# Patient Record
Sex: MALE | URBAN - METROPOLITAN AREA
[De-identification: names, ages, dates, MRNs, and addresses within clinical notes are randomized per-mention and may not be internally consistent; named-entity substitution may affect disease eponyms.]

---

## 2019-10-28 ENCOUNTER — TRANSFERRED RECORDS (OUTPATIENT)
Dept: HEALTH INFORMATION MANAGEMENT | Facility: CLINIC | Age: 39
End: 2019-10-28

## 2019-11-15 ENCOUNTER — TRANSFERRED RECORDS (OUTPATIENT)
Dept: HEALTH INFORMATION MANAGEMENT | Facility: CLINIC | Age: 39
End: 2019-11-15

## 2019-11-20 ENCOUNTER — OFFICE VISIT (OUTPATIENT)
Dept: DERMATOLOGY | Facility: CLINIC | Age: 39
End: 2019-11-20
Payer: COMMERCIAL

## 2019-11-20 DIAGNOSIS — R21 RASH: Primary | ICD-10-CM

## 2019-11-20 RX ORDER — BETAMETHASONE DIPROPIONATE 0.5 MG/G
LOTION TOPICAL 2 TIMES DAILY
Refills: 4 | COMMUNITY
Start: 2019-10-23

## 2019-11-20 RX ORDER — AMLODIPINE BESYLATE 5 MG/1
5 TABLET ORAL DAILY
COMMUNITY
Start: 2019-10-18 | End: 2020-10-17

## 2019-11-20 RX ORDER — CYCLOBENZAPRINE HCL 10 MG
TABLET ORAL PRN
Refills: 0 | COMMUNITY
Start: 2019-03-27

## 2019-11-20 RX ORDER — PREDNISONE 10 MG/1
TABLET ORAL
Qty: 50 TABLET | Refills: 0 | Status: SHIPPED | OUTPATIENT
Start: 2019-11-20

## 2019-11-20 RX ORDER — TRIAMCINOLONE ACETONIDE 1 MG/G
CREAM TOPICAL
COMMUNITY
Start: 2019-04-24

## 2019-11-20 ASSESSMENT — PAIN SCALES - GENERAL
PAINLEVEL: MODERATE PAIN (4)
PAINLEVEL: NO PAIN (0)

## 2019-11-20 NOTE — NURSING NOTE
Dermatology Rooming Note    Darryl Nathan's goals for this visit include:   Chief Complaint   Patient presents with     Derm Problem     Eczema and chronic urticaria - using triamcinolone with slight improvement.     Ana Hendrix, CMA

## 2019-11-20 NOTE — NURSING NOTE
Lidocaine-epinephrine 1-1:614450 % injection   1.5mL once for one use, starting 11/20/2019 ending 11/20/2019,  2mL disp, R-0, injection  Injected by Ana Hendrix CMA

## 2019-11-20 NOTE — PATIENT INSTRUCTIONS
1. Increase cetirizine dose to 20 mg (likely 2 pills) twice a day.  2. Prednisone has been sent to the pharmacy.  3. We will be following up in 2 weeks about your biopsy results and to remove sutures.  4. Continue to use clobetasol ointment and Cerave lotions as you would like.   5. Consider speaking to your PCP about switching amlodipine.    Wound Care After a Biopsy    What is a skin biopsy?  A skin biopsy allows the doctor to examine a very small piece of tissue under the microscope to determine the diagnosis and the best treatment for the skin condition. A local anesthetic (numbing medicine)  is injected with a very small needle into the skin area to be tested. A small piece of skin is taken from the area. Sometimes a suture (stitch) is used.     What are the risks of a skin biopsy?  I will experience scar, bleeding, swelling, pain, crusting and redness. I may experience incomplete removal or recurrence. Risks of this procedure are excessive bleeding, bruising, infection, nerve damage, numbness, thick (hypertrophic or keloidal) scar and non-diagnostic biopsy.    How should I care for my wound for the first 24 hours?    Keep the wound dry and covered for 24 hours    If it bleeds, hold direct pressure on the area for 15 minutes. If bleeding does not stop then go to the emergency room    Avoid strenuous exercise the first 1-2 days or as your doctor instructs you    How should I care for the wound after 24 hours?    After 24 hours, remove the bandage    You may bathe or shower as normal    If you had a scalp biopsy, you can shampoo as usual and can use shower water to clean the biopsy site daily    Clean the wound twice a day with gentle soap and water    Do not scrub, be gentle    Apply white petroleum/Vaseline after cleaning the wound with a cotton swab or a clean finger, and keep the site covered with a Bandaid /bandage. Bandages are not necessary with a scalp biopsy    If you are unable to cover the site with a  Bandaid /bandage, re-apply ointment 2-3 times a day to keep the site moist. Moisture will help with healing    Avoid strenuous activity for first 1-2 days    Avoid lakes, rivers, pools, and oceans until the stitches are removed or the site is healed    How do I clean my wound?    Wash hands thoroughly with soap or use hand  before all wound care    Clean the wound with gentle soap and water    Apply white petroleum/Vaseline  to wound after it is clean    Replace the Bandaid /bandage to keep the wound covered for the first few days or as instructed by your doctor    If you had a scalp biopsy, warm shower water to the area on a daily basis should suffice    What should I use to clean my wound?     Cotton-tipped applicators (Qtips )    White petroleum jelly (Vaseline ). Use a clean new container and use Q-tips to apply.    Bandaids   as needed    Gentle soap     How should I care for my wound long term?    Do not get your wound dirty    Keep up with wound care for one week or until the area is healed.    A small scab will form and fall off by itself when the area is completely healed. The area will be red and will become pink in color as it heals. Sun protection is very important for how your scar will turn out. Sunscreen with an SPF 30 or greater is recommended once the area is healed.    If you have stitches, stitches need to be removed in 14 days. You may return to our clinic for this or you may have it done locally at your doctor s office.    You should have some soreness but it should be mild and slowly go away over several days. Talk to your doctor about using tylenol for pain,    When should I call my doctor?  If you have increased:     Pain or swelling    Pus or drainage (clear or slightly yellow drainage is ok)    Temperature over 100F    Spreading redness or warmth around wound    When will I hear about my results?  The biopsy results can take 2-3 weeks to come back. The clinic will call you with the  results, send you a [employer] message, or have you schedule a follow-up clinic or phone time to discuss the results. Contact our clinics if you do not hear from us in 3 weeks.     Who should I call with questions?    [facility]: [phone]     [facility]: [phone]    For urgent needs outside of business hours call the [facility] at [phone] and ask for the dermatology resident on call

## 2019-11-20 NOTE — PROGRESS NOTES
[facility] Dermatology Note      Dermatology Problem List:  1. Recurrent pruritic skin eruption, urticarial appearance. Ddx drug v urticarial BP v other. Onset ~4/2019.  - s/p punch biopsy 11/20/19  - Prednisone course started 11/20/19  - Cetirizine 20 mg BID  - Topicals: TMC 0.1% cream, betamethasone ointment  - Prior rx: prednisone courses with temporary improvement, TMC 0.1% cream  - Prior outside bx 10/29/19: superficial and mid inflammation with eos and neuts - ddx chronic urticaria versus urticarial drug rash  2. History of latent TB, treated with INH.    Encounter Date: Nov 20, 2019    CC:  Chief Complaint   Patient presents with     Derm Problem     Eczema and chronic urticaria - using triamcinolone with slight improvement.       History of Present Illness:  Mr. Darryl Nathan is a 39 year old male who presents as a referral from [employer] for a pruritic skin eruption, given previous diagnosis of eczema, flaring on and off since April 2019. The rash first appeared a week or two after a positive Strep test which was treated with azithromycin. He was then given a course of prednisone for his rash, which cleared it for about 1 month. It then returned and was again given a 3 week course of prednisone. He also applies triamcinolone 0.1% cream BID, Cerave anti-itch lotion multiple times daily, and takes 10 mg cetirizine daily. 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 He uses a mild Dove bar soap.     He has a prior biopsy in October which was read as superficial and mid inflammation with eosinophils and neutrophils, ddx chronic urticaria versus urticarial drug rash. He states the lesions are present for more than 24 hours in the same locations.    Other medications include amlodipine, which was started in September 2017 for high blood pressure and flexeril which was started in March 2019 for back pain.  He only takes flexeril about monthly. He also has a history of asthma. He denies having seasonal allergies.     Also found upon chart review that in September he also started a PPI for GI bleed but he did not tell us he was taking this today. He may have completed his 8 week course of PPI.    Past Medical History:   There is no problem list on file for this patient.    Past Medical History:   Diagnosis Date     GI bleed      Hypertension      Latent tuberculosis     treated with INH     No past surgical history on file.    Social History:  Patient reports that he quit smoking about 3 years ago. He has never used smokeless tobacco.   He works as a nurse at [employer]     Family History:  Family History   Problem Relation Age of Onset     Melanoma No family hx of      Skin Cancer No family hx of    Father with lung cancer.  No family history of eczema.    Medications:  Current Outpatient Medications   Medication Sig Dispense Refill     amLODIPine (NORVASC) 5 MG tablet Take 5 mg by mouth daily       betamethasone dipropionate (DIPROSONE) 0.05 % external lotion 2 times daily  4     cyclobenzaprine (FLEXERIL) 10 MG tablet as needed  0     predniSONE (DELTASONE) 10 MG tablet 40 mg x 5 days, then 30 mg x 5 days, 20 mg x 5 days, 10 mg x 5 days 50 tablet 0     triamcinolone (KENALOG) 0.1 % external cream        Allergies   Allergen Reactions     Penicillins Unknown         Review of Systems:  -Constitutional: Otherwise feeling well today, in usual state of health.  -Skin: As above in HPI. No additional skin concerns.    Physical exam:  GEN: This is a well developed, well-nourished male in no acute distress, in a pleasant mood.    SKIN: Total skin examination, which includes the head/face, both arms, chest, back, abdomen, both legs, digits and/or nails, was examined.  -Johnson skin type: III  - There are many edematous, pink, raised macules, papules, and plaques confluent on the lateral and anterior legs, arms, anterior  feet, upper chest, and flanks.  -No other lesions of concern on areas examined.     Impression/Plan:  1. Recurrent pruritic skin eruption with urticarial appearance. Ddx drug versus urticarial BP versus other. Present since April 2019 with intermittent flares, treated with prednisone courses with temporary improvement. Prior bx with superficial and mid inflammation with eos and neuts, ddx urticaria versus urticarial drug. Medications include amlodipine daily, flexeril monthly. Also is supposed to be on PPI but this was initiated in Sept per chart review. Will repeat bx today for clarification with DIF. Do not think that repeat course of prednisone is best management option but patient strongly requesting given difficulties in his work and home life. Advised will give one course of prednisone pending bx results but will not give again in future. I am most concerned about drug eruption and advised he talk to PCP about changing amlodipine. We should also ask if he is still taking PPI at next visit.    Punch biopsy x2 for H&E and DIF (performed by faculty): After discussion of benefits and risks including but not limited to bleeding, infection, scar, incomplete removal, recurrence, and non-diagnostic biopsy, written consent and photographs were obtained. Time-out was performed. The area was cleaned with isopropyl alcohol. 1.5 mL of 1% lidocaine with 1:100,000 epinephrine was injected to obtain adequate anesthesia of the lesion on the L forearm x2. Two 4 mm punch biopsies were performed.  4-0 prolene sutures were utilized to approximate the epidermal edges.  White petroleum jelly/VaselineTM and a bandage was applied to the wound.  Explicit verbal and written wound care instructions were provided.  The patient left the Dermatology Clinic in good condition.      Increase cetirizine dose to 20 mg twice daily.    Start prednisone 20 day course. We will not repeat treatment with systemic steroids in the future.    Continue  betamethasone ointment BID prn and Cerave at least daily.    Discuss discontinuation of amlodipine with PCP.     Will discuss if patient is using PPI when we call with results.    Discussed possible future treatment with light therapy.    Photos taken today.    2 week follow up to discuss biopsy results and remove sutures.    [facility]  [address]  [place] [address] on close of this encounter.    Follow-up in 2 weeks, earlier for new or changing lesions.     Staff Involved:  I, Yenny Osman, MS3, saw and examined the patient in the presence of Dr. Tejal Shaw MD.    Staff Physician:  I was present with the medical student who participated in the service and in the documentation of the note. I have verified the history and personally performed the physical exam and medical decision making. I agree with the assessment and plan of care as documented in the note.     The procedure(s) was(were) performed by myself.  Punch biopsy procedure note: After discussion of benefits and risks including but not limited to bleeding, infection, scar, incomplete removal, recurrence, and non-diagnostic biopsy, written consent and photographs were obtained. The area was cleaned with isopropyl alcohol. 1.5 mL of 1% lidocaine with epinephrine was injected to obtain adequate anesthesia of the lesion on the left forearm x2. Two 4 mm punch biopsy was performed.  4-0 Nylon sutures were utilized to approximate the epidermal edges.  White petroleum jelly/VaselineTM and a bandage was applied to the wound.  Explicit verbal and written wound care instructions were provided.  The patient left the Dermatology Clinic in good condition.    Tejal Shaw MD    Department of Dermatology  [facility]: Phone: [phone], Fax: [phone]  11/24/2019

## 2019-11-20 NOTE — LETTER
11/20/2019       RE: Darryl Nathan  [address]  [place] [address]     Dear Colleague,    Thank you for referring your patient, Darryl Nathan, to the [facility] DERMATOLOGY at [facility]. Please see a copy of my visit note below.    [facility] Dermatology Note      Dermatology Problem List:  1. Recurrent pruritic skin eruption, urticarial appearance. Ddx drug v urticarial BP v other. Onset ~4/2019.  - s/p punch biopsy 11/20/19  - Prednisone course started 11/20/19  - Cetirizine 20 mg BID  - Topicals: TMC 0.1% cream, betamethasone ointment  - Prior rx: prednisone courses with temporary improvement, TMC 0.1% cream  - Prior outside bx 10/29/19: superficial and mid inflammation with eos and neuts - ddx chronic urticaria versus urticarial drug rash  2. History of latent TB, treated with INH.    Encounter Date: Nov 20, 2019    CC:  Chief Complaint   Patient presents with     Derm Problem     Eczema and chronic urticaria - using triamcinolone with slight improvement.       History of Present Illness:  Mr. Darryl Nathan is a 39 year old male who presents as a referral from [facility] for a pruritic skin eruption, given previous diagnosis of eczema, flaring on and off since April 2019. The rash first appeared a week or two after a positive Strep test which was treated with azithromycin. He was then given a course of prednisone for his rash, which cleared it for about 1 month. It then returned and was again given a 3 week course of prednisone. He also applies triamcinolone 0.1% cream BID, Cerave anti-itch lotion multiple times daily, and takes 10 mg cetirizine daily. 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 He uses a mild Dove bar soap.     He has a prior biopsy in October which was read as superficial and mid inflammation with eosinophils and neutrophils, ddx  chronic urticaria versus urticarial drug rash. He states the lesions are present for more than 24 hours in the same locations.    Other medications include amlodipine, which was started in September 2017 for high blood pressure and flexeril which was started in March 2019 for back pain. He only takes flexeril about monthly. He also has a history of asthma. He denies having seasonal allergies.     Also found upon chart review that in September he also started a PPI for GI bleed but he did not tell us he was taking this today. He may have completed his 8 week course of PPI.    Past Medical History:   There is no problem list on file for this patient.    Past Medical History:   Diagnosis Date     GI bleed      Hypertension      Latent tuberculosis     treated with INH     No past surgical history on file.    Social History:  Patient reports that he quit smoking about 3 years ago. He has never used smokeless tobacco.   He works as a nurse at [employer].     Family History:  Family History   Problem Relation Age of Onset     Melanoma No family hx of      Skin Cancer No family hx of    Father with lung cancer.  No family history of eczema.    Medications:  Current Outpatient Medications   Medication Sig Dispense Refill     amLODIPine (NORVASC) 5 MG tablet Take 5 mg by mouth daily       betamethasone dipropionate (DIPROSONE) 0.05 % external lotion 2 times daily  4     cyclobenzaprine (FLEXERIL) 10 MG tablet as needed  0     predniSONE (DELTASONE) 10 MG tablet 40 mg x 5 days, then 30 mg x 5 days, 20 mg x 5 days, 10 mg x 5 days 50 tablet 0     triamcinolone (KENALOG) 0.1 % external cream        Allergies   Allergen Reactions     Penicillins Unknown         Review of Systems:  -Constitutional: Otherwise feeling well today, in usual state of health.  -Skin: As above in HPI. No additional skin concerns.    Physical exam:  GEN: This is a well developed, well-nourished male in no acute distress, in a pleasant mood.    SKIN: Total  skin examination, which includes the head/face, both arms, chest, back, abdomen, both legs, digits and/or nails, was examined.  -Johnson skin type: III  - There are many edematous, pink, raised macules, papules, and plaques confluent on the lateral and anterior legs, arms, anterior feet, upper chest, and flanks.  -No other lesions of concern on areas examined.     Impression/Plan:  1. Recurrent pruritic skin eruption with urticarial appearance. Ddx drug versus urticarial BP versus other. Present since April 2019 with intermittent flares, treated with prednisone courses with temporary improvement. Prior bx with superficial and mid inflammation with eos and neuts, ddx urticaria versus urticarial drug. Medications include amlodipine daily, flexeril monthly. Also is supposed to be on PPI but this was initiated in Sept per chart review. Will repeat bx today for clarification with DIF. Do not think that repeat course of prednisone is best management option but patient strongly requesting given difficulties in his work and home life. Advised will give one course of prednisone pending bx results but will not give again in future. I am most concerned about drug eruption and advised he talk to PCP about changing amlodipine. We should also ask if he is still taking PPI at next visit.    Punch biopsy x2 for H&E and DIF (performed by faculty): After discussion of benefits and risks including but not limited to bleeding, infection, scar, incomplete removal, recurrence, and non-diagnostic biopsy, written consent and photographs were obtained. Time-out was performed. The area was cleaned with isopropyl alcohol. 1.5 mL of 1% lidocaine with 1:100,000 epinephrine was injected to obtain adequate anesthesia of the lesion on the L forearm x2. Two 4 mm punch biopsies were performed.  4-0 prolene sutures were utilized to approximate the epidermal edges.  White petroleum jelly/VaselineTM and a bandage was applied to the wound.  Explicit  verbal and written wound care instructions were provided.  The patient left the Dermatology Clinic in good condition.      Increase cetirizine dose to 20 mg twice daily.    Start prednisone 20 day course. We will not repeat treatment with systemic steroids in the future.    Continue betamethasone ointment BID prn and Cerave at least daily.    Discuss discontinuation of amlodipine with PCP.     Will discuss if patient is using PPI when we call with results.    Discussed possible future treatment with light therapy.    Photos taken today.    2 week follow up to discuss biopsy results and remove sutures.    CC [facility] PA-C  [facility] DERMATOLOGY  [facility]  [place] [address] on close of this encounter.    Follow-up in 2 weeks, earlier for new or changing lesions.     Staff Involved:  I, Yenny Osman, MS3, saw and examined the patient in the presence of Dr. Tejal Shaw MD.    Staff Physician:  I was present with the medical student who participated in the service and in the documentation of the note. I have verified the history and personally performed the physical exam and medical decision making. I agree with the assessment and plan of care as documented in the note.     The procedure(s) was(were) performed by myself.  Punch biopsy procedure note: After discussion of benefits and risks including but not limited to bleeding, infection, scar, incomplete removal, recurrence, and non-diagnostic biopsy, written consent and photographs were obtained. The area was cleaned with isopropyl alcohol. 1.5 mL of 1% lidocaine with epinephrine was injected to obtain adequate anesthesia of the lesion on the left forearm x2. Two 4 mm punch biopsy was performed.  4-0 Nylon sutures were utilized to approximate the epidermal edges.  White petroleum jelly/VaselineTM and a bandage was applied to the wound.  Explicit verbal and written wound care instructions were provided.  The patient left the Dermatology Clinic  in good condition.    Tejal Shaw MD    Department of Dermatology  [facility]: Phone: [phone], Fax: [phone]  11/24/2019

## 2019-11-22 LAB — COPATH REPORT: NORMAL

## 2019-11-26 ENCOUNTER — TELEPHONE (OUTPATIENT)
Dept: DERMATOLOGY | Facility: CLINIC | Age: 39
End: 2019-11-26

## 2019-11-26 NOTE — TELEPHONE ENCOUNTER
Spoke with Darryl - he did not stop the amlodipine, he is not on PPI, he did not start prednisone, cetirizine has helped with itching.     Dr. Shaw is unable to see [employer] patients. We canceled his follow up scheduled for 12/4. I offered to schedule him with another provider, he declined. I offered to schedule a follow up with Dr. Shaw in a month, he declined. Darryl notes he will call back to schedule.

## 2019-11-26 NOTE — TELEPHONE ENCOUNTER
----- Message from Tejal Shaw MD sent at 11/25/2019  2:26 PM CST -----  Please let patient know we did not get a slam-dunk diagnosis on his biopsy. Reassuringly there was no signs of an autoimmune disease.  This could still be a drug reaction - has he stopped the amlodipine? Is he still taking the PPI that he was started on in September for stomach irritation/bleeding?  I hope the prednisone has helped calm things down and at his next visit, we can talk about additional options such as light therapy for a better long-term solution.

## 2022-01-17 ENCOUNTER — LAB REQUISITION (OUTPATIENT)
Dept: LAB | Facility: CLINIC | Age: 42
End: 2022-01-17

## 2022-01-17 ENCOUNTER — HOSPITAL ENCOUNTER (OUTPATIENT)
Dept: GENERAL RADIOLOGY | Facility: CLINIC | Age: 42
End: 2022-01-17
Attending: INTERNAL MEDICINE
Payer: COMMERCIAL

## 2022-01-17 DIAGNOSIS — Z86.11 PERSONAL HISTORY OF TUBERCULOSIS: ICD-10-CM

## 2022-01-17 PROCEDURE — 99207 XR CHEST 1 VIEW, EMPLOYEE HEALTH: CPT | Performed by: RADIOLOGY

## 2022-01-17 PROCEDURE — 87340 HEPATITIS B SURFACE AG IA: CPT | Performed by: INTERNAL MEDICINE

## 2022-01-17 PROCEDURE — 86706 HEP B SURFACE ANTIBODY: CPT | Performed by: INTERNAL MEDICINE

## 2022-01-17 PROCEDURE — 999N000028 XR CHEST 1 VIEW, EMPLOYEE HEALTH

## 2022-01-18 LAB
HBV SURFACE AB SERPL IA-ACNC: 34.67 M[IU]/ML
HBV SURFACE AG SERPL QL IA: NONREACTIVE

## 2023-07-18 DIAGNOSIS — R07.9 CHEST PAIN: Primary | ICD-10-CM

## 2023-07-19 ENCOUNTER — LAB (OUTPATIENT)
Dept: LAB | Facility: CLINIC | Age: 43
End: 2023-07-19
Payer: COMMERCIAL

## 2023-07-19 DIAGNOSIS — R07.9 CHEST PAIN: ICD-10-CM

## 2023-07-19 LAB
ANION GAP SERPL CALCULATED.3IONS-SCNC: 12 MMOL/L (ref 7–15)
BUN SERPL-MCNC: 16.7 MG/DL (ref 6–20)
CALCIUM SERPL-MCNC: 9.4 MG/DL (ref 8.6–10)
CHLORIDE SERPL-SCNC: 103 MMOL/L (ref 98–107)
CHOLEST SERPL-MCNC: 133 MG/DL
CREAT SERPL-MCNC: 1.01 MG/DL (ref 0.67–1.17)
DEPRECATED HCO3 PLAS-SCNC: 26 MMOL/L (ref 22–29)
GFR SERPL CREATININE-BSD FRML MDRD: >90 ML/MIN/1.73M2
GLUCOSE SERPL-MCNC: 109 MG/DL (ref 70–99)
HDLC SERPL-MCNC: 35 MG/DL
LDLC SERPL CALC-MCNC: 74 MG/DL
MAGNESIUM SERPL-MCNC: 2 MG/DL (ref 1.7–2.3)
NONHDLC SERPL-MCNC: 98 MG/DL
NT-PROBNP SERPL-MCNC: <36 PG/ML (ref 0–450)
POTASSIUM SERPL-SCNC: 4.2 MMOL/L (ref 3.4–5.3)
SODIUM SERPL-SCNC: 141 MMOL/L (ref 136–145)
TRIGL SERPL-MCNC: 118 MG/DL

## 2023-07-19 PROCEDURE — 80048 BASIC METABOLIC PNL TOTAL CA: CPT | Performed by: PATHOLOGY

## 2023-07-19 PROCEDURE — 80061 LIPID PANEL: CPT | Performed by: PATHOLOGY

## 2023-07-19 PROCEDURE — 83880 ASSAY OF NATRIURETIC PEPTIDE: CPT | Performed by: PATHOLOGY

## 2023-07-19 PROCEDURE — 83735 ASSAY OF MAGNESIUM: CPT | Performed by: PATHOLOGY

## 2023-07-19 PROCEDURE — 36415 COLL VENOUS BLD VENIPUNCTURE: CPT | Performed by: PATHOLOGY

## 2023-07-21 ENCOUNTER — OFFICE VISIT (OUTPATIENT)
Dept: CARDIOLOGY | Facility: CLINIC | Age: 43
End: 2023-07-21
Payer: COMMERCIAL

## 2023-07-21 VITALS
HEART RATE: 82 BPM | SYSTOLIC BLOOD PRESSURE: 148 MMHG | WEIGHT: 250.4 LBS | DIASTOLIC BLOOD PRESSURE: 79 MMHG | OXYGEN SATURATION: 97 %

## 2023-07-21 DIAGNOSIS — E78.2 MIXED HYPERLIPIDEMIA: ICD-10-CM

## 2023-07-21 DIAGNOSIS — I10 BENIGN ESSENTIAL HYPERTENSION: ICD-10-CM

## 2023-07-21 DIAGNOSIS — I25.5 ISCHEMIC CARDIOMYOPATHY: ICD-10-CM

## 2023-07-21 DIAGNOSIS — I21.21 ST ELEVATION MYOCARDIAL INFARCTION INVOLVING LEFT CIRCUMFLEX CORONARY ARTERY (H): Primary | ICD-10-CM

## 2023-07-21 PROCEDURE — 99205 OFFICE O/P NEW HI 60 MIN: CPT | Performed by: INTERNAL MEDICINE

## 2023-07-21 PROCEDURE — 93000 ELECTROCARDIOGRAM COMPLETE: CPT | Performed by: INTERNAL MEDICINE

## 2023-07-21 RX ORDER — METFORMIN HCL 500 MG
1000 TABLET, EXTENDED RELEASE 24 HR ORAL
COMMUNITY
Start: 2022-02-17

## 2023-07-21 RX ORDER — METOPROLOL SUCCINATE 100 MG/1
TABLET, EXTENDED RELEASE ORAL
COMMUNITY
Start: 2023-07-07

## 2023-07-21 RX ORDER — ASPIRIN 81 MG/1
TABLET, COATED ORAL
COMMUNITY
Start: 2023-07-07

## 2023-07-21 RX ORDER — LOSARTAN POTASSIUM 100 MG/1
TABLET ORAL
COMMUNITY
Start: 2023-07-07

## 2023-07-21 RX ORDER — ATORVASTATIN CALCIUM 80 MG/1
1 TABLET, FILM COATED ORAL DAILY
COMMUNITY
Start: 2021-10-04

## 2023-07-21 NOTE — PROGRESS NOTES
"[facility]  Advanced Heart Failure Clinic    Patient Name: Darryl Nathan   : 1980   Date of Visit: 2023    Primary Care Physician: ANTHONY King    Referring Physician: ANTHONY King   Reason for Visit: Establishing cardiac care    Dear Providers,     I had the pleasure of seeing Darryl Nathan today in the [facility] Advanced Heart Failure Clinic. As you know Darryl Nathan is a pleasant 43 year old year old male, who presents today for further cardiac evaluation.    Darryl Nathan is a 43-year-old male with history of coronary artery disease, inferior STEMI in 2020 (at 40 years of age).  He had a sensation of \"drowning\" with some heaviness at chest, with back and arm pain.   At that time he underwent PCI with drug-eluting stent to the left circumflex as well as RCA.  His other history includes hypertension, hyperlipidemia, gastric ulcer with GI bleed, diabetes, latent tuberculosis treated with INH.    Since his PCI he has continued to follow-up with cardiologist Dr. Casper in [facility] and completed cardiac rehab. He had a negative stress test in Dec 2020. From what I can see his last visit there was in 2021.  At that time he was doing well and his Lipitor dose was increased while he was asked to stop his clopidogrel since it had been 1 year since his PCI.      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 He is originally from [place] [facility].     Strong family h/o premature CAD.    Today he presents to establish cardiac care. He experiences \"twitches\" or \"pinch pain\" in left upper chest, mostly with activity. He has 3 kids (ages 8 yrs, 6 years and 1 year). He has not noticed symptoms similar to his MI. He has h/o asthma, but has not had to use an inhaler. No shortness of breath, PND/orthopnea, palpitations, lightheadedness, syncope, leg swelling.  He snores in his sleep, but feels he is sleeping well and not concerned about " sleep apnea. Compliant with medications and notes no problems taking them.     Home BP - not checking recently, in the past has been SBPs 120-130s, no hypertension or hypotension since increasing losartan dose.     ROS:  A complete 10-point ROS was negative except as above.    PAST MEDICAL HISTORY:  Past Medical History:   Diagnosis Date    GI bleed     Hypertension     Latent tuberculosis     treated with INH       PAST SURGICAL HISTORY:  No past surgical history on file.    FAMILY HISTORY:  Family History   Problem Relation Age of Onset    Melanoma No family hx of     Skin Cancer No family hx of    Father  of MI at 65 years of age  Brother  of MI at age 44 years    SOCIAL HISTORY:  Social History     Socioeconomic History    Marital status:    Tobacco Use    Smoking status: Former     Types: Cigarettes     Quit date: 2016     Years since quittin.6    Smokeless tobacco: Never       MEDICATIONS:  Not taking amlodipine  Current Outpatient Medications   Medication Sig    ASPIRIN LOW DOSE 81 MG EC tablet     atorvastatin (LIPITOR) 80 MG tablet Take 1 tablet by mouth daily    losartan (COZAAR) 100 MG tablet     metFORMIN (GLUCOPHAGE XR) 500 MG 24 hr tablet Take 1,000 mg by mouth    metoprolol succinate ER (TOPROL XL) 100 MG 24 hr tablet     amLODIPine (NORVASC) 5 MG tablet Take 5 mg by mouth daily    betamethasone dipropionate (DIPROSONE) 0.05 % external lotion 2 times daily (Patient not taking: Reported on 2023)    cyclobenzaprine (FLEXERIL) 10 MG tablet as needed (Patient not taking: Reported on 2023)    predniSONE (DELTASONE) 10 MG tablet 40 mg x 5 days, then 30 mg x 5 days, 20 mg x 5 days, 10 mg x 5 days (Patient not taking: Reported on 2023)    triamcinolone (KENALOG) 0.1 % external cream  (Patient not taking: Reported on 2023)     No current facility-administered medications for this visit.        ALLERGIES:     Allergies   Allergen Reactions    Aspirin Unknown      History of gastric ulcer with hemorrhage    Penicillins Unknown       PHYSICAL EXAM:  BP (!) 148/79 (BP Location: Right arm, Patient Position: Chair, Cuff Size: Adult Large)   Pulse 82   Wt 113.6 kg (250 lb 6.4 oz)   SpO2 97%   Gen: alert, interactive, NAD  HEENT: atraumatic, EOMI, MMM  Neck: supple, no JVD  CV: RRR, no m/r/g  Chest: CTAB, no wheezes or crackles  Abd: soft, NT, ND, +BS  Ext: no LE edema, 2+ peripheral pulses  Skin: warm and dry, no rashes on exposed surfaces  Neuro: alert and oriented, no focal deficits    LABS:    CMP  Last Comprehensive Metabolic Panel:  Sodium   Date Value Ref Range Status   07/19/2023 141 136 - 145 mmol/L Final     Potassium   Date Value Ref Range Status   07/19/2023 4.2 3.4 - 5.3 mmol/L Final     Chloride   Date Value Ref Range Status   07/19/2023 103 98 - 107 mmol/L Final     Carbon Dioxide (CO2)   Date Value Ref Range Status   07/19/2023 26 22 - 29 mmol/L Final     Anion Gap   Date Value Ref Range Status   07/19/2023 12 7 - 15 mmol/L Final     Glucose   Date Value Ref Range Status   07/19/2023 109 (H) 70 - 99 mg/dL Final     Urea Nitrogen   Date Value Ref Range Status   07/19/2023 16.7 6.0 - 20.0 mg/dL Final     Creatinine   Date Value Ref Range Status   07/19/2023 1.01 0.67 - 1.17 mg/dL Final     GFR Estimate   Date Value Ref Range Status   07/19/2023 >90 >60 mL/min/1.73m2 Final     Calcium   Date Value Ref Range Status   07/19/2023 9.4 8.6 - 10.0 mg/dL Final       NT-proBNP <36    TROP  No results found for: TROPI, TROPONIN, TROPR, TROPN    CBC  CBC RESULTS: No results for input(s): WBC, RBC, HGB, HCT, MCV, MCH, MCHC, RDW, PLT in the last 28831 hours.    LIPIDS  Recent Labs   Lab Test 07/19/23  1135   CHOL 133   HDL 35*   LDL 74   TRIG 118       TSH  No results found for: TSH    HBA1C  No results found for: A1C      CARDIAC DATA:    EKG:  Today, 7/21/2023: Heart rate 72, normal sinus rhythm, normal ECG with small Q waves in lead III    Echo:  [facility], August 2020    Final  Impressions:    1. Normal LV size, mildly increased wall thickness, normal global systolic function with an estimated EF of 60 - 65%.    2. Basal inferior segment is abnormal.    3. The mitral valve is normal, mild mitral regurgitation.    4. Echo contrast was administrered to enhance visualization of all left ventricular segments.       Stress Test:  [facility], December 2020 12/23 Nuc stress  1. No definitive evidence of relative myocardial ischemia following exercise myocardial perfusion imaging.   2. There is a mild intensity, predominantly matched reduction in tracer uptake involving the mid-basal inferolateral wall, that is not associated with regional dysfunction, and corrects almost completely with prone imaging. These findings could represent soft tissue attenuation artifact, although prior partial thickness infarction in the inferolateral   distribution cannot be excluded with a high degree of certainty.   3. Left ventricular size and function are normal with a calculated post stress left ventricular ejection fraction of 67%. No regional wall motion abnormalities.   4. No EKG changes suggestive of ischemia with stress.   5. Exercise capacity fair. 7 METS achieved.       Cardiac MRI: None available      Cardiac Catheterization:  [facility], August 2020    * Acute inferior ST elevation MI, DIAGNOSTIC - CORONARY   * The left main artery has minimal disease.   * The LAD has mild disease.  20% stenosis in proximal LAD  * The mid to distal circumflex artery is severely diseased with RENATO grade 1-2 flow (culprit).  95% stenosis in mid circumflex  * The RCA is chronically occluded with bridging and left to right collaterals, possible small channel through occlusion.  100% stenosis in proximal RCA, 40% stenosis in proximal RCA, 70% stenosis in distal RCA  HEMODYNAMICS   * The LVEDP is mildly elevated     Successful IVUS guided  2mm x 12mm Balloon, 4mm x 22mm Drug Eluting Stent, 4mm x 12mm Balloon, and  4.5mm x 12mm  Balloon to Mid to Distal Circumflex, post stenosis 0%   Successful IVUS guided 1.25mm x 6mm Balloon, 1.2mm x 12mm Balloon, 2mm x 20mm Balloon, 2.5mm x 38mm Drug Eluting Stent, 2.5mm x 38mm Balloon, 3mm x 38mm Drug Eluting Stent, and 3mm x 38mm Balloon to Proximal RCA, post stenosis 0%   Successful 2.25mm x 15mm Drug Eluting Stent,  2.25mm x 15mm Balloon, 2.5mm x 12mm Balloon, and 3mm x 20mm Balloon to Distal RCA, post stenosis 0%         ASSESSMENT/PLAN:  In summary, Darryl Nathan is here today with the following -      1.  History of inferior STEMI in August 2020, s/p PCI to circumflex and proximal and distal RCA  2.  Ischemic cardiomyopathy with preserved ejection fraction and basal inferior hypokinesis (echo August 2020)  3.  Hypertension  4.  Hyperlipidemia  5.  Diabetes mellitus.   6.  History of gastric ulcer with GI bleed      - Monitor BPs at home as his blood pressures are elevated here  - Diet changes to see impact on cholesterol as LDL 74 with his CAD. Repeat lipid panel in 3 months with diet changes, if LDL still higher than 65-70, then add zetia  - Exercise nuclear stress test  - Follow-up in 6 months      I spent 60 minutes in care of the patient today including obtaining recent medical history, personally reviewing recent cardiac testing and/or lab results, today's examination, discussion of testing results and care recommendations with patient.       Thank you for the opportunity to participate in this patient's care. Please feel free to reach out with any questions or concerns.      Maicol Fregoso MD, [facility]  Associate Professor of Medicine  Advanced Heart Failure, LVAD & Cardiac Transplant  Director, Cardio-Obstetrics  Cardio-Oncology  [facility]

## 2023-07-21 NOTE — NURSING NOTE
Cardiac Testing: Patient given instructions regarding exercise nuclear stress test. Discussed purpose, preparation, procedure and when to expect results reported back to the patient. Patient demonstrated understanding of this information and agreed to call with further questions or concerns.    Labs: Patient was given results of the laboratory testing obtained today.  Patient demonstrated understanding of this information and agreed to call with further questions or concerns. Lipids in 3 months.     Med Reconcile: Reviewed and verified all current medications with the patient. The updated medication list was printed and given to the patient.    Return Appointment: Patient given instructions regarding scheduling next clinic visit. Patient demonstrated understanding of this information and agreed to call with further questions or concerns. Follow up in 6 months with Cardiology.    Patient stated he understood all health information given and agreed to call with further questions or concerns.    Migdalia Mcduffie, RN, BSN  Cardiology RN Care Coordinator   [place]   Phone: [phone]  Fax: [phone] ([place]) [phone] ([place])

## 2023-07-21 NOTE — NURSING NOTE
Darryl Nathan's goals for this visit include:   Chief Complaint   Patient presents with     New Patient     Self referred  Chest pains occasionally for the past 2-3 weeks on and off  pinching pain        He requests these members of his care team be copied on today's visit information: yes     PCP: No Ref-Primary, Physician    Referring Provider:  No referring provider defined for this encounter.    BP (!) 148/79 (BP Location: Right arm, Patient Position: Chair, Cuff Size: Adult Large)   Pulse 82   Wt 113.6 kg (250 lb 6.4 oz)   SpO2 97%     Do you need any medication refills at today's visit? No     TONO Ford   Cardiology Team  [facility]

## 2023-07-21 NOTE — LETTER
"    2023        RE: Darryl Nathan  [address] [place] [address]        [facility]  Advanced Heart Failure Clinic    Patient Name: Darryl Nathan   : 1980   Date of Visit: 2023    Primary Care Physician: ANTHONY King    Referring Physician: ANTHONY King   Reason for Visit: Establishing cardiac care    Dear Providers,     I had the pleasure of seeing Darryl Nathan today in the [facility] Advanced Heart Failure Clinic. As you know Darryl Nathan is a pleasant 43 year old year old male, who presents today for further cardiac evaluation.    Darryl Nathan is a 43-year-old male with history of coronary artery disease, inferior STEMI in 2020 (at 40 years of age).  He had a sensation of \"drowning\" with some heaviness at chest, with back and arm pain.   At that time he underwent PCI with drug-eluting stent to the left circumflex as well as RCA.  His other history includes hypertension, hyperlipidemia, gastric ulcer with GI bleed, diabetes, latent tuberculosis treated with INH.    Since his PCI he has continued to follow-up with cardiologist Dr. Casper in [facility] and completed cardiac rehab. He had a negative stress test in Dec 2020. From what I can see his last visit there was in 2021.  At that time he was doing well and his Lipitor dose was increased while he was asked to stop his clopidogrel since it had been 1 year since his PCI.      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     Strong family h/o premature CAD.    Today he presents to establish cardiac care. He experiences \"twitches\" or \"pinch pain\" in left upper chest, mostly with activity. He has 3 kids (ages 8 yrs, 6 years and 1 year). He has not noticed symptoms similar to his MI. He has h/o asthma, but has not had to use an inhaler. No shortness of breath, PND/orthopnea, palpitations, lightheadedness, syncope, leg " swelling.  He snores in his sleep, but feels he is sleeping well and not concerned about sleep apnea. Compliant with medications and notes no problems taking them.     Home BP - not checking recently, in the past has been SBPs 120-130s, no hypertension or hypotension since increasing losartan dose.     ROS:  A complete 10-point ROS was negative except as above.    PAST MEDICAL HISTORY:  Past Medical History:   Diagnosis Date     GI bleed      Hypertension      Latent tuberculosis     treated with INH       PAST SURGICAL HISTORY:  No past surgical history on file.    FAMILY HISTORY:  Family History   Problem Relation Age of Onset     Melanoma No family hx of      Skin Cancer No family hx of    Father  of MI at 65 years of age  Brother  of MI at age 44 years    SOCIAL HISTORY:  Social History     Socioeconomic History     Marital status:    Tobacco Use     Smoking status: Former     Types: Cigarettes     Quit date: 2016     Years since quittin.6     Smokeless tobacco: Never       MEDICATIONS:  Not taking amlodipine  Current Outpatient Medications   Medication Sig     ASPIRIN LOW DOSE 81 MG EC tablet      atorvastatin (LIPITOR) 80 MG tablet Take 1 tablet by mouth daily     losartan (COZAAR) 100 MG tablet      metFORMIN (GLUCOPHAGE XR) 500 MG 24 hr tablet Take 1,000 mg by mouth     metoprolol succinate ER (TOPROL XL) 100 MG 24 hr tablet      amLODIPine (NORVASC) 5 MG tablet Take 5 mg by mouth daily     betamethasone dipropionate (DIPROSONE) 0.05 % external lotion 2 times daily (Patient not taking: Reported on 2023)     cyclobenzaprine (FLEXERIL) 10 MG tablet as needed (Patient not taking: Reported on 2023)     predniSONE (DELTASONE) 10 MG tablet 40 mg x 5 days, then 30 mg x 5 days, 20 mg x 5 days, 10 mg x 5 days (Patient not taking: Reported on 2023)     triamcinolone (KENALOG) 0.1 % external cream  (Patient not taking: Reported on 2023)     No current facility-administered  medications for this visit.        ALLERGIES:     Allergies   Allergen Reactions     Aspirin Unknown     History of gastric ulcer with hemorrhage     Penicillins Unknown       PHYSICAL EXAM:  BP (!) 148/79 (BP Location: Right arm, Patient Position: Chair, Cuff Size: Adult Large)   Pulse 82   Wt 113.6 kg (250 lb 6.4 oz)   SpO2 97%   Gen: alert, interactive, NAD  HEENT: atraumatic, EOMI, MMM  Neck: supple, no JVD  CV: RRR, no m/r/g  Chest: CTAB, no wheezes or crackles  Abd: soft, NT, ND, +BS  Ext: no LE edema, 2+ peripheral pulses  Skin: warm and dry, no rashes on exposed surfaces  Neuro: alert and oriented, no focal deficits    LABS:    CMP  Last Comprehensive Metabolic Panel:  Sodium   Date Value Ref Range Status   07/19/2023 141 136 - 145 mmol/L Final     Potassium   Date Value Ref Range Status   07/19/2023 4.2 3.4 - 5.3 mmol/L Final     Chloride   Date Value Ref Range Status   07/19/2023 103 98 - 107 mmol/L Final     Carbon Dioxide (CO2)   Date Value Ref Range Status   07/19/2023 26 22 - 29 mmol/L Final     Anion Gap   Date Value Ref Range Status   07/19/2023 12 7 - 15 mmol/L Final     Glucose   Date Value Ref Range Status   07/19/2023 109 (H) 70 - 99 mg/dL Final     Urea Nitrogen   Date Value Ref Range Status   07/19/2023 16.7 6.0 - 20.0 mg/dL Final     Creatinine   Date Value Ref Range Status   07/19/2023 1.01 0.67 - 1.17 mg/dL Final     GFR Estimate   Date Value Ref Range Status   07/19/2023 >90 >60 mL/min/1.73m2 Final     Calcium   Date Value Ref Range Status   07/19/2023 9.4 8.6 - 10.0 mg/dL Final       NT-proBNP <36    TROP  No results found for: TROPI, TROPONIN, TROPR, TROPN    CBC  CBC RESULTS: No results for input(s): WBC, RBC, HGB, HCT, MCV, MCH, MCHC, RDW, PLT in the last 54597 hours.    LIPIDS  Recent Labs   Lab Test 07/19/23  1135   CHOL 133   HDL 35*   LDL 74   TRIG 118       TSH  No results found for: TSH    HBA1C  No results found for: A1C      CARDIAC DATA:    EKG:  Today, 7/21/2023: Heart rate  72, normal sinus rhythm, normal ECG with small Q waves in lead III    Echo:  [facility], August 2020    Final Impressions:    1. Normal LV size, mildly increased wall thickness, normal global systolic function with an estimated EF of 60 - 65%.    2. Basal inferior segment is abnormal.    3. The mitral valve is normal, mild mitral regurgitation.    4. Echo contrast was administrered to enhance visualization of all left ventricular segments.       Stress Test:  [facility], December 2020 12/23 Nuc stress  1. No definitive evidence of relative myocardial ischemia following exercise myocardial perfusion imaging.   2. There is a mild intensity, predominantly matched reduction in tracer uptake involving the mid-basal inferolateral wall, that is not associated with regional dysfunction, and corrects almost completely with prone imaging. These findings could represent soft tissue attenuation artifact, although prior partial thickness infarction in the inferolateral   distribution cannot be excluded with a high degree of certainty.   3. Left ventricular size and function are normal with a calculated post stress left ventricular ejection fraction of 67%. No regional wall motion abnormalities.   4. No EKG changes suggestive of ischemia with stress.   5. Exercise capacity fair. 7 METS achieved.       Cardiac MRI: None available      Cardiac Catheterization:  [facility], August 2020    * Acute inferior ST elevation MI, DIAGNOSTIC - CORONARY   * The left main artery has minimal disease.   * The LAD has mild disease.  20% stenosis in proximal LAD  * The mid to distal circumflex artery is severely diseased with RENATO grade 1-2 flow (culprit).  95% stenosis in mid circumflex  * The RCA is chronically occluded with bridging and left to right collaterals, possible small channel through occlusion.  100% stenosis in proximal RCA, 40% stenosis in proximal RCA, 70% stenosis in distal RCA  HEMODYNAMICS   * The LVEDP is mildly elevated     Successful  IVUS guided  2mm x 12mm Balloon, 4mm x 22mm Drug Eluting Stent, 4mm x 12mm Balloon, and  4.5mm x 12mm Balloon to Mid to Distal Circumflex, post stenosis 0%   Successful IVUS guided 1.25mm x 6mm Balloon, 1.2mm x 12mm Balloon, 2mm x 20mm Balloon, 2.5mm x 38mm Drug Eluting Stent, 2.5mm x 38mm Balloon, 3mm x 38mm Drug Eluting Stent, and 3mm x 38mm Balloon to Proximal RCA, post stenosis 0%   Successful 2.25mm x 15mm Drug Eluting Stent,  2.25mm x 15mm Balloon, 2.5mm x 12mm Balloon, and 3mm x 20mm Balloon to Distal RCA, post stenosis 0%         ASSESSMENT/PLAN:  In summary, Darryl Nathan is here today with the following -      1.  History of inferior STEMI in August 2020, s/p PCI to circumflex and proximal and distal RCA  2.  Ischemic cardiomyopathy with preserved ejection fraction and basal inferior hypokinesis (echo August 2020)  3.  Hypertension  4.  Hyperlipidemia  5.  Diabetes mellitus.   6.  History of gastric ulcer with GI bleed      - Monitor BPs at home as his blood pressures are elevated here  - Diet changes to see impact on cholesterol as LDL 74 with his CAD. Repeat lipid panel in 3 months with diet changes, if LDL still higher than 65-70, then add zetia  - Exercise nuclear stress test  - Follow-up in 6 months      I spent 60 minutes in care of the patient today including obtaining recent medical history, personally reviewing recent cardiac testing and/or lab results, today's examination, discussion of testing results and care recommendations with patient.       Thank you for the opportunity to participate in this patient's care. Please feel free to reach out with any questions or concerns.      Maicol Fregoso MD, [facility]  Associate Professor of Medicine  Advanced Heart Failure, LVAD & Cardiac Transplant  Director, Cardio-Obstetrics  Cardio-Oncology  [facility]        Sincerely,        Maicol Fregoso MD

## 2023-07-21 NOTE — PATIENT INSTRUCTIONS
Take your medicines every day, as directed     Changes made today:  Nuclear Exercise Stress Test  Labs in 3 months  Continue to monitor blood pressures at home  Follow up in 6 months with Cardiology       Cardiology Care Coordinators:      Adriana LISA RN     Cardiology Rooming staff:  Ronnie POWER    Phone  [phone]      Fax [phone]    To Contact us     During Business Hours:  [phone]     If you are needing refills please contact your pharmacy.     For urgent after hour care please call the [facility] at [phone] or the [facility] at [phone] and ask to speak to the cardiologist on call.            HOW TO CHECK YOUR BLOOD PRESSURE AT HOME:     Avoid eating, smoking, and exercising for at least 30 minutes before taking a reading.     Be sure you have taken your BP medication at least 2-3 hours before you check it.      Sit quietly for 10 minutes before a reading.      Sit in a chair with your feet flat on the floor. Rest your  arm on a table so that the arm cuff is at the same level as your heart.     Remain still during the reading.  Record your blood pressure and pulse in a log and bring to your next appointment.       Use [employer] allows you to communicate directly with your heart team through secure messaging.  [facility] can be accessed any time on your phone, computer, or tablet.  If you need assistance, we'd be happy to help!             Keep your Heart Appointments:     Follow up in 6 months with cardiology       Please arrive at least 15 minutes prior to your scheduled time.    Why do I need this test?  This test checks how well blood is flowing through your heart at rest and stress (during exercise).  How do I get ready for the test?  Please follow the steps below.  - You may need to stop some medicines before the  test.  Follow your doctor s orders.  - Stop taking your beta-blocker unless instructed otherwise. Hold beta blocker before test.  - Do not take nitrates on the day of your test. Do not wear your Nitro-Patch.  - For patients with diabetes:  - If you take insulin, call your diabetes care team. Ask if you should take a   dose the morning of your test.  - If you take diabetes medicine by mouth, don t take it on the morning of your test. Bring it with you to take after the test. (If you have questions, call your diabetes care team.)  - Stop all caffeine 12 hours before the test.  This includes coffee, tea, soda pop, chocolate and certain medicines (such as Anacin and Excedrin).  Also avoid decaf coffee and tea, as these contain caffeine.  - No alcohol, smoking or other tobacco for 12 hours before the test.  - Stop eating 3 hours before the test. You may drink water or juice.  - Please wear a comfortable 2 piece outfit and walking shoes.  - When you arrive, please tell us if you are diabetic, pregnant or have taken Viagra, Cialis or Levitra in the past 48 hours.  What happens during this test?  Your visit will take about 1 hour.  Before and near the end your stress test, we will do an ultrasound of the heart.  The stress test:  1. We will place a blood pressure cuff on your arm. We will also attach small pads to your chest. The pads are hooked to EKG (electrocardiogram) machine. The machine shows how your heart is working during the test.  2. You will begin the stress test using a recumbent bike and will need to pedal for 4 to 15 minutes. It starts with minimal resistance and increases in resistance every 2 minutes to bring your heart rate up.

## 2023-07-30 ENCOUNTER — HEALTH MAINTENANCE LETTER (OUTPATIENT)
Age: 43
End: 2023-07-30

## 2023-08-01 ENCOUNTER — HOSPITAL ENCOUNTER (OUTPATIENT)
Dept: CARDIOLOGY | Facility: CLINIC | Age: 43
Discharge: HOME | End: 2023-08-01
Attending: INTERNAL MEDICINE
Payer: COMMERCIAL

## 2023-08-01 ENCOUNTER — HOSPITAL ENCOUNTER (OUTPATIENT)
Dept: NUCLEAR MEDICINE | Facility: CLINIC | Age: 43
Setting detail: NUCLEAR MEDICINE
Discharge: HOME | End: 2023-08-01
Attending: INTERNAL MEDICINE
Payer: COMMERCIAL

## 2023-08-01 DIAGNOSIS — I21.21 ST ELEVATION MYOCARDIAL INFARCTION INVOLVING LEFT CIRCUMFLEX CORONARY ARTERY (H): ICD-10-CM

## 2023-08-01 DIAGNOSIS — I25.5 ISCHEMIC CARDIOMYOPATHY: ICD-10-CM

## 2023-08-01 DIAGNOSIS — I10 BENIGN ESSENTIAL HYPERTENSION: ICD-10-CM

## 2023-08-01 DIAGNOSIS — E78.2 MIXED HYPERLIPIDEMIA: ICD-10-CM

## 2023-08-01 PROCEDURE — A9502 TC99M TETROFOSMIN: HCPCS | Performed by: INTERNAL MEDICINE

## 2023-08-01 PROCEDURE — 343N000001 HC RX 343: Performed by: INTERNAL MEDICINE

## 2023-08-01 PROCEDURE — 93018 CV STRESS TEST I&R ONLY: CPT | Performed by: INTERNAL MEDICINE

## 2023-08-01 PROCEDURE — 78452 HT MUSCLE IMAGE SPECT MULT: CPT

## 2023-08-01 PROCEDURE — 78452 HT MUSCLE IMAGE SPECT MULT: CPT | Mod: 26 | Performed by: INTERNAL MEDICINE

## 2023-08-01 PROCEDURE — 93017 CV STRESS TEST TRACING ONLY: CPT

## 2023-08-01 PROCEDURE — 93016 CV STRESS TEST SUPVJ ONLY: CPT | Performed by: INTERNAL MEDICINE

## 2023-08-01 RX ADMIN — TETROFOSMIN 37 MILLICURIE: 1.38 INJECTION, POWDER, LYOPHILIZED, FOR SOLUTION INTRAVENOUS at 14:43

## 2023-08-01 RX ADMIN — TETROFOSMIN 10.5 MILLICURIE: 1.38 INJECTION, POWDER, LYOPHILIZED, FOR SOLUTION INTRAVENOUS at 13:21

## 2023-08-02 LAB
CV STRESS MAX HR HE: 154
PREDICTED VO2MAX: 20
RATE PRESSURE PRODUCT: NORMAL
STRESS ECHO BASELINE DIASTOLIC HE: 90
STRESS ECHO BASELINE HR: 75 BPM
STRESS ECHO BASELINE SYSTOLIC BP: 127
STRESS ECHO CALCULATED PERCENT HR: 86 %
STRESS ECHO LAST STRESS DIASTOLIC BP: 87
STRESS ECHO LAST STRESS SYSTOLIC BP: 225
STRESS ECHO POST ESTIMATED WORKLOAD: 5.8 METS
STRESS ECHO POST EXERCISE DUR MIN: 6 MIN
STRESS ECHO POST EXERCISE DUR SEC: 7 SEC
STRESS ECHO TARGET HR: 179

## 2024-09-22 ENCOUNTER — HEALTH MAINTENANCE LETTER (OUTPATIENT)
Age: 44
End: 2024-09-22